# Patient Record
(demographics unavailable — no encounter records)

---

## 2025-03-07 NOTE — PROCEDURE
[de-identified] : INJECTION VISCOSUPPLEMENT RIGHT  SHOULDER GH JOINT  Patient has demonstrated limited relief from NSAIDS, rest, exercises / PT, and after discussion of the risks and benefits, the patient has elected to proceed with an ULTRASOUND GUIDED injection into the RIGHT SHOULDER GH JOINT    Confirmed that the patient does not have history of prior adverse reactions, active, infections, or relevant allergies. There was no effusion, erythema, or warmth, and the skin was clear  The skin was sterilized with alcohol. Ethyl Chloride was used as a topical anesthetic. Routine sterile technique.  The site was injected UTILIZING ULTRASOUND GUIDANCE to confirm appropriate placement of the needle- with VISCOSUPPLEMENT. The injection was completed without complication and a bandage was applied.   The patient tolerated the procedure well and was given post-injection instructions.Rec: Cold therapy, analgesics, avoid heavy activity. MEDICATION: MONOVISC 4cc

## 2025-03-07 NOTE — DISCUSSION/SUMMARY
[de-identified] :   PATIENT HAS ELECTED TO PROCEED WITH MONOVISC  INJECTION SHOULDER RISKS AND BENEFITS DISCUSSED - VERBAL CONSENT OBTAINED SEE PROCEDURE NOTE     POST INJECTION INSTRUCTIONS:   INJECTION THERAPY HANDOUT PROVIDED   COLD THERAPY , ANALGESICS PRN   HOME  EXERCISES QD -  PENDULUM AND ROM  HANDOUT PROVIDED, REVIEWED AND DEMONSTRATED - REFERRED TO INSTRUCTIONAL VIDEO ON MY WEBSITE  CONSIDER  P.T.  WITHIN 2 WEEKS AFTER INJECTION - 2 X 4 WEEKS - PROGRESS TO HOME EXERCISES   CONSIDER PRP INJECTION IF NO RELIEF  CONSIDER SHOULDER ARTHROPLASTY

## 2025-03-07 NOTE — REASON FOR VISIT
[Follow-Up Visit] : a follow-up visit for [Shoulder Arthritis] : shoulder arthritis [Family Member] : family member

## 2025-03-07 NOTE — PHYSICAL EXAM
[de-identified] : PHYSICAL EXAM  RIGHT  SHOULDER    SCAPULAR PROTRACTION AROM 120 /120 / 50 / 0  TENDER: GH JOINT  SPECIAL TESTING : GONZALEZ - POSITIVE  CHRISTY - POSITIVE  SPEED TEST - POSITIVE  DAMICO - NEGATIVE  APPREHENSION AND SUPPRESSION - NEGATIVE   RC STRENGTH TESTING  SS:  5/5 SUB 5/5 IS     5/5 BICEPS  5/5  SENSATION  - GROSSLY INTACT

## 2025-03-07 NOTE — HISTORY OF PRESENT ILLNESS
[de-identified] : RIGHT SHOULDER PAIN  FOLLOW UP  MONOVISC INJT JOEL    PREVIOUS HPI [id number]- [place]   LOCATION: RIGHT SHODULER PAIN  - 3 YEARS  DOMINANT HAND  - RIGHT HAND DOMINENT  DURATION: PAIN STARTED  CONTEXT: ATRAUMATIC/TRAUMATIC- NO SPECIFIC INJURY QUALITY: SHARP / ACHY / THROBBING/ BURNING/ SHOOTING  RADIATING PAIN UP NECK OR DOWN ARM  CONSTANT / INTERMITTENT / LOCALIZED   PAIN LEVEL: TREATMENTS: PATIENT HAS TRIED  AGGRAVATING FACTORS: PAIN WORSENS WITH  PAIN WORSE DURING THE DAY / AT NIGHT / SLEEPING ASSOCIATED SYMPTOMS: NUMBNESS / TINGLING  ASSOCIATED CLICKING/LOCKING/POPPING/GRINDING PREVIOUS CORTISONE   PREVIOUS PHYSICAL THERPAY PRIOR STUDIES: